# Patient Record
(demographics unavailable — no encounter records)

---

## 2025-03-26 NOTE — HISTORY OF PRESENT ILLNESS
[Dull/Aching] : dull/aching [Localized] : localized [Sharp] : sharp [Intermittent] : intermittent [Nothing helps with pain getting better] : Nothing helps with pain getting better [Exercising] : exercising [Full time] : Work status: full time [de-identified] : 3/26/25 New patient right hip pain 2 week onset, injured when he extended his back to an infant. discomfort with walking. Taking tylenol with some relief. [de-identified] : Stocking

## 2025-03-26 NOTE — IMAGING
[Straightening consistent with spasm] : Straightening consistent with spasm [AP] : anteroposterior [There are no fractures, subluxations or dislocations. No significant abnormalities are seen] : There are no fractures, subluxations or dislocations. No significant abnormalities are seen [Dysplasia] : Dysplasia [de-identified] :   ----------------------------------------------------------------------------   Thoracic/Lumbar spine exam:   Inspection:    (neg) Abnormal alignment (kyphosis/lordosis/scoliosis)   (neg) Atrophy ROM:    Pain:           (neg) Flexion/extension     (neg) Rotation    Stiffness:   (+) Flexion/extension     (+) Rotation                       (neg) Hamstring tightness Tenderness/Spasm:             Lumbar paraspinal:          (mild) Right    (neg) Left    (neg) Midline    Thoracic paraspinal:        (neg) Right    (neg) Left    (neg) Midline    PSIS:                                (neg) Right    (neg) Left    SI joint:                             (neg) Right    (neg) Left    Greater troch:                  (neg) Right    (neg) Left Strength: (out of 5)    Illiopsoas:           Right: 5   .    Left: 5    Quad:                 Right: 5   .    Left: 5    Hamstrings:        Right: 5   .    Left: 5    Anterior tibialis:  Right: 5    .   Left: 5    Gastrocsoleus:  Right: 5    .   Left: 5    EHL:                    Right: 5    .   Left: 5 Neuro: DTR's wnl.  Sensation to light touch grossly in tact in all distributions.    (neg) SLR    (neg) Femoral stretch Vascularity: Extremity warm and well perfused Gait: normal    ----------------------------------------------------------------------------   Right hip exam:   Inspection: no deformity, no swelling, no gross limb length discrepancy ROM:    Flexion: 90    ER: 50    IR: 20 Tenderness:    (neg) Groin tenderness    (mild) Greater trochanteric tenderness    (neg) Buttock tenderness    (neg) IT Band tenderness    (neg) Anterior thigh tenderness    (neg) ASIS tenderness    (neg) Ischial tuberosity    (neg) Hamstring muscle tenderness Additional tests:    (neg) FADIR    (neg) BANDAR    (neg) Resisted hip flexion pain    (neg) Apprehension (external rotation/extension)    (neg) Posterior pain with forced hip flexion and knee extension    (neg) Tight hamstrings    (neg) Log roll    (neg) Axial load Strength: 5/5 IP/Q/H/TA/GS/EHL Neuro: In tact to light touch throughout, DTR's wnl Vascularity: Extremity warm and well perfused Gait: normal.    [FreeTextEntry1] : facet arthropathy

## 2025-03-26 NOTE — DISCUSSION/SUMMARY
[de-identified] : Discussed options, Start Physical Therapy Naproxen 500mg BID 4-5 days then prn f/u 6-8 weeks   ----------------------------------------------------------------------------   Patient warned of specific risks of medication related to bleeding, GI issues, increase blood pressure, and cardiac risks in addition to additional risks.  Patient advised to discuss with PMD  if any presence of stated issues.  ----------------------------------------------------------------------------   All relevant imaging studies pertinent to today's visit, including x-rays, MRI's and/or other advanced imaging studies (CT/etc) were independently interpreted and reviewed with the patient as needed. Implications of the studies together with the patient's clinical picture were discussed to formulate a working diagnosis and management options were detailed.   The patient and/or guardian was advised of the diagnosis.  The natural history of the pathology was explained in full. All questions were answered.  The risks and benefits of conservative and interventional treatment alternatives were explained to the patient   The patient and/or guardian was advised if any advanced diagnostic/imaging study (MRI/CT/etc) is ordered to evaluate potential pathology in the affected area(s), they should follow up in the office to review the results of the study and determine further management that may be indicated.